# Patient Record
Sex: MALE | Race: WHITE | Employment: FULL TIME | URBAN - NONMETROPOLITAN AREA
[De-identification: names, ages, dates, MRNs, and addresses within clinical notes are randomized per-mention and may not be internally consistent; named-entity substitution may affect disease eponyms.]

---

## 2024-05-16 RX ORDER — FLUTICASONE PROPIONATE 50 MCG
2 SPRAY, SUSPENSION (ML) NASAL PRN
COMMUNITY
Start: 2024-03-28

## 2024-05-16 RX ORDER — ATORVASTATIN CALCIUM 20 MG/1
20 TABLET, FILM COATED ORAL DAILY
COMMUNITY

## 2024-05-16 RX ORDER — HYDROCHLOROTHIAZIDE 12.5 MG/1
12.5 CAPSULE, GELATIN COATED ORAL
COMMUNITY

## 2024-05-16 RX ORDER — AMLODIPINE BESYLATE AND BENAZEPRIL HYDROCHLORIDE 5; 20 MG/1; MG/1
1 CAPSULE ORAL
COMMUNITY

## 2024-05-16 RX ORDER — FEXOFENADINE HCL AND PSEUDOEPHEDRINE HCI 60; 120 MG/1; MG/1
1 TABLET, EXTENDED RELEASE ORAL 2 TIMES DAILY
COMMUNITY

## 2024-05-16 RX ORDER — FEXOFENADINE HCL 180 MG/1
180 TABLET ORAL DAILY
COMMUNITY
Start: 2024-03-28

## 2024-05-16 RX ORDER — DICLOFENAC SODIUM 75 MG/1
75 TABLET, DELAYED RELEASE ORAL DAILY
COMMUNITY

## 2024-05-17 ENCOUNTER — OFFICE VISIT (OUTPATIENT)
Dept: GASTROENTEROLOGY | Age: 54
End: 2024-05-17
Payer: COMMERCIAL

## 2024-05-17 VITALS
BODY MASS INDEX: 32.51 KG/M2 | HEART RATE: 92 BPM | DIASTOLIC BLOOD PRESSURE: 82 MMHG | WEIGHT: 267 LBS | HEIGHT: 76 IN | SYSTOLIC BLOOD PRESSURE: 130 MMHG | OXYGEN SATURATION: 96 %

## 2024-05-17 DIAGNOSIS — R19.5 POSITIVE COLORECTAL CANCER SCREENING USING COLOGUARD TEST: Primary | ICD-10-CM

## 2024-05-17 PROCEDURE — 99204 OFFICE O/P NEW MOD 45 MIN: CPT | Performed by: NURSE PRACTITIONER

## 2024-05-17 NOTE — PROGRESS NOTES
Subjective:     Patient ID: Dennis Rdz is a 53 y.o. male  PCP: Hu Hernandez MD  Referring Provider: Hu Hernandez MD    HPI  Patient presents to the office today with the following complaints: New Patient, Other (Abn feces, (+) cologuard ), and Colonoscopy      Patient seen in the office today due to positive cologuard   Denies seeing blood in his stool and no black stool   Denies pain   He has not had a colonoscopy   Reports normal bowel movements denies diarrhea or constipation       Assessment:     1. Positive colorectal cancer screening using Cologuard test       Review of Systems   Constitutional:  Negative for activity change, appetite change, fatigue, fever and unexpected weight change.   HENT:  Negative for trouble swallowing.    Respiratory:  Negative for cough, choking and shortness of breath.    Cardiovascular:  Negative for chest pain.   Gastrointestinal:  Negative for abdominal distention, abdominal pain, anal bleeding, blood in stool, constipation, diarrhea, nausea, rectal pain and vomiting.   Allergic/Immunologic: Negative for food allergies.   All other systems reviewed and are negative.      Plan:   Schedule Colonoscopy  Instruct on bowel prep.   Nothing to eat or drink after midnight the day of the exam.  Unable to drive for 24 hours after the procedure.   No aspirin or nonsteroidal anti-inflammatories for 5 days before procedure.  I have discussed the benefits, alternatives, and risks (including bleeding, perforation and death)  for pursuing Endoscopy (EGD/Colonscopy/EUS/ERCP) with the patient and they are willing to continue. We also discussed the need for anesthesia, IV access, proper dietary changes, medication changes if necessary, and need for bowel prep (if ordered) prior to their Endoscopic procedure.  They are aware they must have someone accompany them to their scheduled procedure to drive them home - they agree to the above and are willing to continue.     Orders  No orders of

## 2024-05-20 ENCOUNTER — ANESTHESIA EVENT (OUTPATIENT)
Dept: OPERATING ROOM | Age: 54
End: 2024-05-20

## 2024-05-20 NOTE — ANESTHESIA PRE PROCEDURE
Department of Anesthesiology  Preprocedure Note       Name:  Dennis Rdz   Age:  53 y.o.  :  1970                                          MRN:  [id number]         Date:  2024      Surgeon: Surgeon(s):  Surjit Richards MD    Procedure: Procedure(s):  COLORECTAL CANCER SCREENING, NOT HIGH RISK    Medications prior to admission:   Prior to Admission medications    Medication Sig Start Date End Date Taking? Authorizing Provider   fexofenadine (ALLEGRA) 180 MG tablet Take 1 tablet by mouth daily 3/28/24   Dick Torres MD   fexofenadine-pseudoephedrine (ALLEGRA-D ALLERGY & CONGESTION)  MG per extended release tablet Take 1 tablet by mouth 2 times daily    Dick Torres MD   amLODIPine-benazepril (LOTREL) 5-20 MG per capsule Take 1 capsule by mouth Every Day    Dick Torres MD   atorvastatin (LIPITOR) 20 MG tablet Take 1 tablet by mouth daily    Dick Torres MD   diclofenac (VOLTAREN) 75 MG EC tablet Take 1 tablet by mouth daily    Dick Torres MD   fluticasone (FLONASE) 50 MCG/ACT nasal spray 2 sprays by Nasal route as needed for Rhinitis or Allergies 3/28/24   Dick Torres MD   hydroCHLOROthiazide 12.5 MG capsule Take 1 capsule by mouth Every Day    Dick Torres MD       Current medications:    No current facility-administered medications for this encounter.     Current Outpatient Medications   Medication Sig Dispense Refill    fexofenadine (ALLEGRA) 180 MG tablet Take 1 tablet by mouth daily      fexofenadine-pseudoephedrine (ALLEGRA-D ALLERGY & CONGESTION)  MG per extended release tablet Take 1 tablet by mouth 2 times daily      amLODIPine-benazepril (LOTREL) 5-20 MG per capsule Take 1 capsule by mouth Every Day      atorvastatin (LIPITOR) 20 MG tablet Take 1 tablet by mouth daily      diclofenac (VOLTAREN) 75 MG EC tablet Take 1 tablet by mouth daily      fluticasone (FLONASE) 50 MCG/ACT nasal spray 2 sprays by Nasal route

## 2024-05-21 ENCOUNTER — APPOINTMENT (OUTPATIENT)
Dept: OPERATING ROOM | Age: 54
End: 2024-05-21
Attending: INTERNAL MEDICINE

## 2024-05-21 ENCOUNTER — HOSPITAL ENCOUNTER (OUTPATIENT)
Age: 54
Setting detail: OUTPATIENT SURGERY
Discharge: HOME | End: 2024-05-21
Attending: INTERNAL MEDICINE | Admitting: INTERNAL MEDICINE
Payer: COMMERCIAL

## 2024-05-21 ENCOUNTER — ANESTHESIA (OUTPATIENT)
Dept: OPERATING ROOM | Age: 54
End: 2024-05-21

## 2024-05-21 ENCOUNTER — HOSPITAL ENCOUNTER (OUTPATIENT)
Age: 54
Setting detail: SPECIMEN
Discharge: HOME | End: 2024-05-21
Payer: COMMERCIAL

## 2024-05-21 VITALS
SYSTOLIC BLOOD PRESSURE: 134 MMHG | BODY MASS INDEX: 31.66 KG/M2 | HEART RATE: 72 BPM | OXYGEN SATURATION: 92 % | TEMPERATURE: 97.2 F | HEIGHT: 76 IN | DIASTOLIC BLOOD PRESSURE: 78 MMHG | RESPIRATION RATE: 16 BRPM | WEIGHT: 260 LBS

## 2024-05-21 PROCEDURE — 88305 TISSUE EXAM BY PATHOLOGIST: CPT

## 2024-05-21 PROCEDURE — 45385 COLONOSCOPY W/LESION REMOVAL: CPT | Performed by: INTERNAL MEDICINE

## 2024-05-21 PROCEDURE — 45385 COLONOSCOPY W/LESION REMOVAL: CPT

## 2024-05-21 RX ORDER — PROPOFOL 10 MG/ML
INJECTION, EMULSION INTRAVENOUS PRN
Status: DISCONTINUED | OUTPATIENT
Start: 2024-05-21 | End: 2024-05-21 | Stop reason: SDUPTHER

## 2024-05-21 RX ORDER — LIDOCAINE HYDROCHLORIDE 10 MG/ML
INJECTION, SOLUTION EPIDURAL; INFILTRATION; INTRACAUDAL; PERINEURAL PRN
Status: DISCONTINUED | OUTPATIENT
Start: 2024-05-21 | End: 2024-05-21 | Stop reason: SDUPTHER

## 2024-05-21 RX ORDER — SODIUM CHLORIDE, SODIUM LACTATE, POTASSIUM CHLORIDE, CALCIUM CHLORIDE 600; 310; 30; 20 MG/100ML; MG/100ML; MG/100ML; MG/100ML
INJECTION, SOLUTION INTRAVENOUS CONTINUOUS
Status: DISCONTINUED | OUTPATIENT
Start: 2024-05-21 | End: 2024-05-21 | Stop reason: HOSPADM

## 2024-05-21 RX ADMIN — SODIUM CHLORIDE, SODIUM LACTATE, POTASSIUM CHLORIDE, CALCIUM CHLORIDE: 600; 310; 30; 20 INJECTION, SOLUTION INTRAVENOUS at 10:13

## 2024-05-21 RX ADMIN — LIDOCAINE HYDROCHLORIDE 30 MG: 10 INJECTION, SOLUTION EPIDURAL; INFILTRATION; INTRACAUDAL; PERINEURAL at 12:14

## 2024-05-21 RX ADMIN — PROPOFOL 400 MG: 10 INJECTION, EMULSION INTRAVENOUS at 12:14

## 2024-05-21 ASSESSMENT — PAIN - FUNCTIONAL ASSESSMENT: PAIN_FUNCTIONAL_ASSESSMENT: NONE - DENIES PAIN

## 2024-05-21 NOTE — H&P
Patient Name: Dennis Rdz  : 1970  MRN: [id number]  DATE: 24    Allergies: No Known Allergies     ENDOSCOPY  History and Physical    Procedure:    [] Diagnostic Colonoscopy       [x] Screening Colonoscopy  [] EGD      [] ERCP      [] EUS       [] Other    [x] Previous office notes/History and Physical reviewed from the patients chart. Please see EMR for further details of HPI. I have examined the patient's status immediately prior to the procedure and:      Indications/HPI: Recent positive Cologuard test requiring this colonoscopy exam to check for polyps and colon cancer    []Abdominal Pain   []Cancer- GI/Lung     []Fhx of colon CA/polyps  []History of Polyps  []Barretts            []Melena  []Abnormal Imaging              []Dysphagia              []Persistent Pneumonia   []Anemia                            []Food Impaction        []History of Polyps  [] GI Bleed             []Pulmonary nodule/Mass   []Change in bowel habits []Heartburn/Reflux  []Rectal Bleed (BRBPR)  []Chest Pain - Non Cardiac []Heme (+) Stool []Ulcers  []Constipation  []Hemoptysis  []Varices  []Diarrhea  []Hypoxemia    []Nausea/Vomiting   []Screening   []Crohns/Colitis  []Other:     Anesthesia:   [x] MAC [] Moderate Sedation   [] General   [] None     ROS: 12 pt Review of Symptoms was negative unless mentioned above    Medications:   Prior to Admission medications    Medication Sig Start Date End Date Taking? Authorizing Provider   fexofenadine (ALLEGRA) 180 MG tablet Take 1 tablet by mouth daily 3/28/24   Dick Torres MD   fexofenadine-pseudoephedrine (ALLEGRA-D ALLERGY & CONGESTION)  MG per extended release tablet Take 1 tablet by mouth 2 times daily    Dick Torres MD   amLODIPine-benazepril (LOTREL) 5-20 MG per capsule Take 1 capsule by mouth Every Day    Dick Torres MD   atorvastatin (LIPITOR) 20 MG tablet Take 1 tablet by mouth daily    Dick Torres MD   diclofenac (VOLTAREN)  75 MG EC tablet Take 1 tablet by mouth daily    Dick Torres MD   fluticasone (FLONASE) 50 MCG/ACT nasal spray 2 sprays by Nasal route as needed for Rhinitis or Allergies 3/28/24   Dick Torres MD   hydroCHLOROthiazide 12.5 MG capsule Take 1 capsule by mouth Every Day    Dick Torres MD       Past Medical History:  Past Medical History:   Diagnosis Date    Sleep apnea     c pap       Past Surgical History:  Past Surgical History:   Procedure Laterality Date    KNEE ARTHROSCOPY Bilateral     x2 each side    TONSILLECTOMY         Social History:  Social History     Tobacco Use    Smoking status: Former     Types: Cigarettes    Smokeless tobacco: Never   Vaping Use    Vaping Use: Never used   Substance Use Topics    Alcohol use: Yes     Alcohol/week: 2.0 standard drinks of alcohol     Types: 2 Shots of liquor per week     Comment: occ    Drug use: Never       Vital Signs:   Vitals:    05/21/24 1005   BP: (!) 148/90   Pulse: 72   Resp: 16   Temp: 97 °F (36.1 °C)   SpO2: 96%        Physical Exam:  Cardiac:  [x]WNL  []Comments:  Pulmonary:  [x]WNL   []Comments:  Neuro/Mental Status:  [x]WNL  []Comments:  Abdominal:  [x]WNL    []Comments:  Other:   []WNL  []Comments:    Informed Consent:  The risks and benefits of the procedure have been discussed with either the patient or if they cannot consent, their representative.    Assessment:  Patient examined and appropriate for planned sedation and procedure.     Plan:  Proceed with planned sedation and procedure as above.         Surjit Richards MD

## 2024-05-21 NOTE — DISCHARGE INSTRUCTIONS
POST-OP ORDERS: ENDOSCOPY & COLONOSCOPY:    1. Rest today.  2. DO NOT eat or drink until wide awake; eat your usual diet today in moderate amount only.  3. DO NOT drive today.  4. Call physician if you have severe pain, vomiting, fever, rectal bleeding or black bowel movements.  5.  If a biopsy was taken or a polyp removed, you should expect to hear results in about 21 days.  If you have heard nothing from your physician by then, call the office for results.  6.  Discharge home when patient awake, vitals signs stable and tolerating liquids.  7. Call with questions or concerns [phone].  NSAIDS (Non-steroidal Anti-Inflammatory)    You have been directed by your physician to avoid any NSAID's; the following medications are a list of those to avoid. If you think that you are taking any NSAID's notify your physician.     Over The Counter    Advil                      Motrin  Nuprin                   Ibuprofen  Midol                     Aleve  Naproxen              Orudis  Aspirin                   [place]    Prescriptions and Generics    Cataflam              Relafen  Voltaren               Clinoril  Indocin                 Naproxen  Arthrotec              Lodine  Daypro                 Nalfon  Toradol                Ansaid  Feldene               Meclofenamate  Fenoprofen          Ponstel  Mobic                   Celebrex  Vioxx      1. Repeat colonoscopy: pending pathology -in 3 years based on colonoscopy findings today, prep quality and his positive Cologuard test recently; sooner if his personal or family history as pertaining to colorectal cancer risk changes requiring an earlier exam or if the patient were to develop lower GI symptoms such as bleeding, abdominal pain, change in bowel habits or stool caliber or if the patient has anemia or unexplained weight loss in the future.   2. Await biopsy results-you will receive a letter with your results within 7-10 days.    - Resume previous meds and diet  - GI  clinic f/u PRN   - Keep scheduled f/u appts with other MDs     - NO ASA/NSAIDs x 2 weeks

## 2024-05-21 NOTE — ANESTHESIA PRE PROCEDURE
Department of Anesthesiology  Preprocedure Note       Name:  Dennis Rdz   Age:  53 y.o.  :  1970                                          MRN:  [id number]         Date:  2024      Surgeon: Surgeon(s):  Surjit Richards MD    Procedure: Procedure(s):  COLORECTAL CANCER SCREENING, NOT HIGH RISK    Medications prior to admission:   Prior to Admission medications    Medication Sig Start Date End Date Taking? Authorizing Provider   fexofenadine (ALLEGRA) 180 MG tablet Take 1 tablet by mouth daily 3/28/24   Dick Torres MD   fexofenadine-pseudoephedrine (ALLEGRA-D ALLERGY & CONGESTION)  MG per extended release tablet Take 1 tablet by mouth 2 times daily    Dick Torres MD   amLODIPine-benazepril (LOTREL) 5-20 MG per capsule Take 1 capsule by mouth Every Day    Dick Torres MD   atorvastatin (LIPITOR) 20 MG tablet Take 1 tablet by mouth daily    Dick Torres MD   diclofenac (VOLTAREN) 75 MG EC tablet Take 1 tablet by mouth daily    Dick Torres MD   fluticasone (FLONASE) 50 MCG/ACT nasal spray 2 sprays by Nasal route as needed for Rhinitis or Allergies 3/28/24   Dick Torres MD   hydroCHLOROthiazide 12.5 MG capsule Take 1 capsule by mouth Every Day    Dick Torres MD       Current medications:    Current Facility-Administered Medications   Medication Dose Route Frequency Provider Last Rate Last Admin   • lactated ringers IV soln infusion   IntraVENous Continuous Surjit Richards  mL/hr at 24 1013 New Bag at 24 1013       Allergies:  No Known Allergies    Problem List:  There is no problem list on file for this patient.      Past Medical History:        Diagnosis Date   • Sleep apnea     c pap       Past Surgical History:        Procedure Laterality Date   • KNEE ARTHROSCOPY Bilateral     x2 each side   • TONSILLECTOMY         Social History:    Social History     Tobacco Use   • Smoking status: Former

## 2024-05-21 NOTE — ANESTHESIA POSTPROCEDURE EVALUATION
Department of Anesthesiology  Postprocedure Note    Patient: Dennis Rdz  MRN: [id number]  YOB: 1970  Date of evaluation: 5/21/2024    Procedure Summary       Date: 05/21/24 Room / Location: [place] [address] / [facility]    Anesthesia Start: 1208 Anesthesia Stop:     Procedure: COLORECTAL CANCER SCREENING, NOT HIGH RISK (Abdomen) Diagnosis:       Positive colorectal cancer screening using Cologuard test      (Positive colorectal cancer screening using Cologuard test [R19.5])    Surgeons: Surjit Richards MD Responsible Provider: Brittaney Smith APRN - CRNA    Anesthesia Type: general, TIVA ASA Status: 3            Anesthesia Type: No value filed.    Aung Phase I:      Aung Phase II:      Anesthesia Post Evaluation    Patient location during evaluation: bedside  Patient participation: complete - patient participated  Level of consciousness: sleepy but conscious  Pain score: 0  Airway patency: patent  Nausea & Vomiting: no nausea and no vomiting  Cardiovascular status: blood pressure returned to baseline  Respiratory status: acceptable, room air and spontaneous ventilation  Hydration status: euvolemic  Pain management: adequate    No notable events documented.

## 2024-05-21 NOTE — OP NOTE
Patient: Dennis Rdz : 1970  Med Rec#: [id number] Acc#: [id number]   Primary Care Provider Hu Hernandez MD    Date of Procedure:  2024    Endoscopist: Surjit Richards MD, MD    Referring Provider: Hu Hernandez MD,     Operation Performed: Colonoscopy up to cecum with     hot snare resection of polyps    Indications:  Recent positive Cologuard test requiring this colonoscopy exam to check for polyps and colon cancer    Anesthesia:  Sedation was administered by anesthesia who monitored the patient during the procedure.    I met with Dennis Rdz prior to procedure. We discussed the procedure itself, and I have discussed the risks of endoscopy (including-- but not limited to-- pain, discomfort, bleeding potentially requiring second endoscopic procedure and/or blood transfusion, organ perforation requiring operative repair, damage to organs near the colon, infection, aspiration, cardiopulmonary/allergic reaction), benefits, indications to endoscopy. Additionally, we discussed options other than colonoscopy. The patient expressed understanding. All questions answered. The patient decided to proceed with the procedure.  Signed informed consent was placed on the chart.    Blood Loss: minimal    Withdrawal time: More than 9 minutes  Bowel Prep: Fair  with small amounts of thick semisolid stool and a moderate amount of thick, opaque liquid scattered in patchy segments throughout the colon obscuring the underlying mucosa.Lesions including polyps may have been missed.     Complications: no immediate complications    DESCRIPTION OF PROCEDURE:     A time out was performed. After written informed consent was obtained, the patient was placed in the left lateral position.     The perianal area was inspected, and a digital rectal exam was performed. A rectal exam was performed: normal tone, no palpable lesions. At this point, a forward viewing Olympus colonoscope was inserted into the anus and

## 2024-05-23 ASSESSMENT — ENCOUNTER SYMPTOMS
CONSTIPATION: 0
CHOKING: 0
NAUSEA: 0
COUGH: 0
ANAL BLEEDING: 0
SHORTNESS OF BREATH: 0
TROUBLE SWALLOWING: 0
DIARRHEA: 0
RECTAL PAIN: 0
VOMITING: 0
BLOOD IN STOOL: 0
ABDOMINAL DISTENTION: 0
ABDOMINAL PAIN: 0

## (undated) DEVICE — TRAP,MUCUS SPECIMEN,40CC: Brand: MEDLINE

## (undated) DEVICE — BRUSH ENDOSCP 2 END CHN HEDGEHOG

## (undated) DEVICE — CANNULA NSL AD L7FT DIV O2 CO2 W/ M LUERLOCK TRMPT CONN

## (undated) DEVICE — PATIENT RETURN ELECTRODE, SINGLE-USE, CONTACT QUALITY MONITORING, ADULT, WITH 9FT CORD, FOR PATIENTS WEIGING OVER 33LBS. (15KG): Brand: MEGADYNE

## (undated) DEVICE — SNARE POLYP SM W13MMXL240CM SHTH DIA2.4MM OVL FLX DISP

## (undated) DEVICE — ENDO KIT,LOURDES HOSPITAL: Brand: MEDLINE INDUSTRIES, INC.

## (undated) DEVICE — ADAPTER CLEANING PORPOISE CLEANING

## (undated) DEVICE — SINGLE PORT MANIFOLD: Brand: NEPTUNE 2

## (undated) DEVICE — CLEANING SPONGE: Brand: KOALA™

## (undated) DEVICE — SUPPLEMENT DIGESTIVE H2O SOL GI-EASE